# Patient Record
Sex: FEMALE | Race: WHITE | HISPANIC OR LATINO | URBAN - METROPOLITAN AREA
[De-identification: names, ages, dates, MRNs, and addresses within clinical notes are randomized per-mention and may not be internally consistent; named-entity substitution may affect disease eponyms.]

---

## 2017-02-15 ENCOUNTER — NEW REFERRAL (OUTPATIENT)
Dept: URBAN - METROPOLITAN AREA CLINIC 73 | Facility: CLINIC | Age: 77
End: 2017-02-15

## 2017-02-15 DIAGNOSIS — E11.3552: ICD-10-CM

## 2017-02-15 DIAGNOSIS — H25.12: ICD-10-CM

## 2017-02-15 DIAGNOSIS — E11.3591: ICD-10-CM

## 2017-02-15 PROCEDURE — 92287 ANT SGM IMG IR FLRSCN ANGRPH: CPT

## 2017-02-15 PROCEDURE — 92225 OPHTHALMOSCOPY (INITIAL): CPT

## 2017-02-15 PROCEDURE — 92134 CPTRZ OPH DX IMG PST SGM RTA: CPT

## 2017-02-15 PROCEDURE — 99204 OFFICE O/P NEW MOD 45 MIN: CPT

## 2017-02-15 PROCEDURE — 1036F TOBACCO NON-USER: CPT

## 2017-02-15 PROCEDURE — 92235 FLUORESCEIN ANGRPH MLTIFRAME: CPT

## 2017-02-15 PROCEDURE — 5010F MACUL RESULT PHY/QHP MNG DM: CPT

## 2017-02-15 PROCEDURE — G8397 DIL MACULA/FUNDUS EXAM/W DOC: HCPCS

## 2017-02-15 PROCEDURE — 2022F DILAT RTA XM EVC RTNOPTHY: CPT

## 2017-02-15 PROCEDURE — G8427 DOCREV CUR MEDS BY ELIG CLIN: HCPCS

## 2017-02-15 PROCEDURE — 92250 FUNDUS PHOTOGRAPHY W/I&R: CPT

## 2017-02-15 PROCEDURE — 2021F DILAT MACULAR EXAM DONE: CPT

## 2017-02-15 ASSESSMENT — VISUAL ACUITY
OS_CC: 20/80
OD_CC: 3/200

## 2017-02-15 ASSESSMENT — TONOMETRY
OS_IOP_MMHG: 20
OD_IOP_MMHG: 17

## 2017-03-01 ENCOUNTER — PROCEDURE ONLY (OUTPATIENT)
Dept: URBAN - METROPOLITAN AREA CLINIC 73 | Facility: CLINIC | Age: 77
End: 2017-03-01

## 2017-03-01 DIAGNOSIS — E11.3591: ICD-10-CM

## 2017-03-01 PROCEDURE — 67228 TREATMENT X10SV RETINOPATHY: CPT

## 2017-03-01 PROCEDURE — 67500 INJECT/TREAT EYE SOCKET: CPT

## 2017-03-01 ASSESSMENT — TONOMETRY
OD_IOP_MMHG: 17
OS_IOP_MMHG: 19

## 2017-03-01 ASSESSMENT — VISUAL ACUITY
OS_CC: 20/400
OD_CC: 3/200
OS_PH: 20/125-1

## 2018-11-14 ENCOUNTER — HOSPITAL ENCOUNTER (OUTPATIENT)
Dept: HOSPITAL 31 - C.SDS | Age: 78
Discharge: HOME | End: 2018-11-14
Attending: OPHTHALMOLOGY
Payer: MEDICARE

## 2018-11-14 VITALS
TEMPERATURE: 97.6 F | SYSTOLIC BLOOD PRESSURE: 140 MMHG | DIASTOLIC BLOOD PRESSURE: 69 MMHG | OXYGEN SATURATION: 97 % | HEART RATE: 62 BPM | RESPIRATION RATE: 16 BRPM

## 2018-11-14 DIAGNOSIS — M19.90: ICD-10-CM

## 2018-11-14 DIAGNOSIS — E78.5: ICD-10-CM

## 2018-11-14 DIAGNOSIS — F03.90: ICD-10-CM

## 2018-11-14 DIAGNOSIS — H25.12: Primary | ICD-10-CM

## 2018-11-14 DIAGNOSIS — E11.9: ICD-10-CM

## 2018-11-14 DIAGNOSIS — I10: ICD-10-CM

## 2018-11-14 DIAGNOSIS — F32.9: ICD-10-CM

## 2018-11-14 LAB
BUN SERPL-MCNC: 23 MG/DL (ref 7–17)
CALCIUM SERPL-MCNC: 9.5 MG/DL (ref 8.6–10.4)
ERYTHROCYTE [DISTWIDTH] IN BLOOD BY AUTOMATED COUNT: 12.4 % (ref 11.5–14.5)
GFR NON-AFRICAN AMERICAN: 48
HGB BLD-MCNC: 11 G/DL (ref 11–16)
MCH RBC QN AUTO: 29.1 PG (ref 27–31)
MCHC RBC AUTO-ENTMCNC: 33 G/DL (ref 33–37)
MCV RBC AUTO: 88.1 FL (ref 81–99)
PLATELET # BLD: 318 K/UL (ref 130–400)
PMV BLD AUTO: 8.3 FL (ref 7.2–11.7)
RBC # BLD AUTO: 3.77 MIL/UL (ref 3.8–5.2)
WBC # BLD AUTO: 7.6 K/UL (ref 4.8–10.8)

## 2018-11-14 PROCEDURE — 82948 REAGENT STRIP/BLOOD GLUCOSE: CPT

## 2018-11-14 PROCEDURE — 80048 BASIC METABOLIC PNL TOTAL CA: CPT

## 2018-11-14 PROCEDURE — 66984 XCAPSL CTRC RMVL W/O ECP: CPT

## 2018-11-14 PROCEDURE — 36415 COLL VENOUS BLD VENIPUNCTURE: CPT

## 2018-11-14 PROCEDURE — 85027 COMPLETE CBC AUTOMATED: CPT

## 2018-11-14 NOTE — OP
PROCEDURE DATE:  11/14/2018



PREOPERATIVE DIAGNOSIS:  Hypermature cataract, left eye.



POSTOPERATIVE DIAGNOSIS:  Hypermature cataract, left eye.



PROCEDURE:  Phacoemulsification of left eye, insertion of posterior chamber

implant with utilization of capsular dye.



SURGEON:  Az Macario MD



TYPE OF ANESTHESIA:  Local with IV sedation.



DESCRIPTION OF PROCEDURE:  The patient was brought into the operating room

and placed in supine position, prepped and draped in the usual fashion for

ophthalmic surgery.  Lid speculum inserted,  lids and exposing

globe.  On inspection, there was noted to be a hypermature cataract.  A

side-port incision was made superiorly and inferiorly with disposable sharp

blade.  An air bubble was injected in the anterior chamber followed by

capsular dye to stain the anterior capsule.  The dye was irrigated out of

the anterior chamber with balanced salt solution.  The anterior chamber was

deepened with Viscoat.  A near clear corneal incision was made temporally

with a 2.75-mm keratome.  Capsulorrhexis was performed with Utrata forceps.

Hydrodissection was carried out with balanced salt solution.  The nucleus

was then phacoemulsified.  Remaining cortical fragments were aspirated with

a split irrigation and aspiration system.  The capsular sac was filled with

Provisc.  Posterior chamber lens was injected into the sac and rotated into

horizontal position.  Provisc was aspirated out of the anterior chamber. 

Pupil was constricted with Miochol.  The wound was hydrated with balanced

salt solution and found to be watertight.  Topical Timoptic, Betadine,

TobraDex ointment, and pressure patch were applied.  The patient tolerated

the procedure well.





__________________________________________

Az Macario MD





DD:  11/14/2018 12:33:03

DT:  11/14/2018 12:50:46

Job # [id number]

## 2018-11-21 ENCOUNTER — HOSPITAL ENCOUNTER (OUTPATIENT)
Dept: HOSPITAL 31 - C.ENDO | Age: 78
Discharge: HOME | End: 2018-11-21
Attending: INTERNAL MEDICINE
Payer: MEDICARE

## 2018-11-21 VITALS — SYSTOLIC BLOOD PRESSURE: 142 MMHG | DIASTOLIC BLOOD PRESSURE: 57 MMHG | HEART RATE: 56 BPM | OXYGEN SATURATION: 99 %

## 2018-11-21 VITALS — TEMPERATURE: 97.5 F

## 2018-11-21 VITALS — RESPIRATION RATE: 12 BRPM

## 2018-11-21 DIAGNOSIS — K29.50: Primary | ICD-10-CM

## 2018-11-21 DIAGNOSIS — I25.10: ICD-10-CM

## 2018-11-21 DIAGNOSIS — E11.9: ICD-10-CM

## 2018-11-21 PROCEDURE — 88305 TISSUE EXAM BY PATHOLOGIST: CPT

## 2018-11-21 PROCEDURE — 43239 EGD BIOPSY SINGLE/MULTIPLE: CPT

## 2018-11-21 PROCEDURE — 82948 REAGENT STRIP/BLOOD GLUCOSE: CPT

## 2018-11-21 NOTE — CP.SD[facility]P
[facility]





- History


Proposed Procedure: EGD


Pre-Op Diagnosis: abdominal pain





- Previous Medical/Surgical History


Cardiac: ASHD/CAD


Endocrine/Metabolic: Diabetes


Comments: dementia





- Allergies


Allergies: 


Allergies





No Known Allergies Allergy (Verified 11/21/18 09:10)


   











- Physical Exam


General Appearance: NAD


Vital Signs: 


                                   Vital Signs











  11/21/18





  09:00


 


Temperature 97.7 F


 


Pulse Rate 56 L


 


Respiratory 19





Rate 


 


Blood Pressure 154/58 H


 


O2 Sat by Pulse 97





Oximetry 











Neuro: WNL


Heart: WNL


Lungs: WNL


GI: WNL





- {Optional Preform as Required}


Abdomen: WNL





- Impression


Pt. Evaluated Today:Candidate for Anesthesia & Procedure: Yes





- Date & Time


Date: 11/21/18


Time: 11:49





Short Stay Discharge





- Short Stay Discharge


Admitting Diagnosis/Reason for Visit: EPIGASTRIC PAIN


Disposition: HOME/ ROUTINE


Referrals: 


Yokasta Lira MD [Primary Care Provider] -